# Patient Record
Sex: MALE | Race: WHITE | NOT HISPANIC OR LATINO | Employment: UNEMPLOYED | URBAN - METROPOLITAN AREA
[De-identification: names, ages, dates, MRNs, and addresses within clinical notes are randomized per-mention and may not be internally consistent; named-entity substitution may affect disease eponyms.]

---

## 2018-07-03 ENCOUNTER — OFFICE VISIT (OUTPATIENT)
Dept: URGENT CARE | Facility: CLINIC | Age: 33
End: 2018-07-03
Payer: COMMERCIAL

## 2018-07-03 VITALS
HEIGHT: 69 IN | DIASTOLIC BLOOD PRESSURE: 80 MMHG | HEART RATE: 84 BPM | BODY MASS INDEX: 31.84 KG/M2 | TEMPERATURE: 98 F | OXYGEN SATURATION: 98 % | SYSTOLIC BLOOD PRESSURE: 128 MMHG | WEIGHT: 215 LBS | RESPIRATION RATE: 16 BRPM

## 2018-07-03 DIAGNOSIS — H57.89 IRRITATION OF RIGHT EYE: ICD-10-CM

## 2018-07-03 DIAGNOSIS — H10.9 CONJUNCTIVITIS OF RIGHT EYE, UNSPECIFIED CONJUNCTIVITIS TYPE: ICD-10-CM

## 2018-07-03 PROCEDURE — 99204 OFFICE O/P NEW MOD 45 MIN: CPT | Performed by: NURSE PRACTITIONER

## 2018-07-03 RX ORDER — POLYMYXIN B SULFATE AND TRIMETHOPRIM 1; 10000 MG/ML; [USP'U]/ML
1 SOLUTION OPHTHALMIC EVERY 4 HOURS
Qty: 10 ML | Refills: 0 | Status: SHIPPED | OUTPATIENT
Start: 2018-07-03 | End: 2018-07-08

## 2018-07-03 ASSESSMENT — ENCOUNTER SYMPTOMS
EYE REDNESS: 1
COUGH: 0
FEVER: 0

## 2018-07-04 NOTE — PROGRESS NOTES
"Subjective:      Nadir Carranza is a 33 y.o. male who presents with Eye Problem (Since yesterday eye redness and sore)            Conjunctivitis   This is a new problem. Episode onset: pt reports he thinks he has pink eye. States he believes he was exposed 2 days ago when he was wearing a borrowed helmet. He reports tenderness around right eye with some redness to eye. Denies drainage. The problem occurs constantly. The problem has been unchanged. Pertinent negatives include no congestion, coughing or fever. He has tried nothing for the symptoms.       Review of Systems   Constitutional: Negative for fever.   HENT: Negative for congestion.    Eyes: Positive for redness.   Respiratory: Negative for cough.    All other systems reviewed and are negative.    No past medical history on file. No past surgical history on file.   Social History     Social History   • Marital status: Unknown     Spouse name: N/A   • Number of children: N/A   • Years of education: N/A     Occupational History   • Not on file.     Social History Main Topics   • Smoking status: Never Smoker   • Smokeless tobacco: Never Used   • Alcohol use Not on file   • Drug use: Unknown   • Sexual activity: Not on file     Other Topics Concern   • Not on file     Social History Narrative   • No narrative on file          Objective:     /80   Pulse 84   Temp 36.7 °C (98 °F)   Resp 16   Ht 1.753 m (5' 9\")   Wt 97.5 kg (215 lb)   SpO2 98%   BMI 31.75 kg/m²      Physical Exam   Constitutional: He is oriented to person, place, and time. Vital signs are normal. He appears well-developed and well-nourished.   HENT:   Head: Normocephalic and atraumatic.   Eyes: EOM are normal. Pupils are equal, round, and reactive to light. Right eye exhibits no discharge. Right conjunctiva is injected (slight).   Neck: Normal range of motion.   Cardiovascular: Normal rate and regular rhythm.    Pulmonary/Chest: Effort normal.   Musculoskeletal: Normal range " of motion.   Neurological: He is alert and oriented to person, place, and time.   Skin: Skin is warm and dry. Capillary refill takes less than 2 seconds.   Psychiatric: He has a normal mood and affect. His speech is normal and behavior is normal. Thought content normal.   Vitals reviewed.              Assessment/Plan:     1. Irritation of right eye  - polymixin-trimethoprim (POLYTRIM) 81751-5.1 UNIT/ML-% Solution; Place 1 Drop in right eye every 4 hours for 5 days.  Dispense: 10 mL; Refill: 0    2. Conjunctivitis of right eye, unspecified conjunctivitis type  - polymixin-trimethoprim (POLYTRIM) 58819-9.1 UNIT/ML-% Solution; Place 1 Drop in right eye every 4 hours for 5 days.  Dispense: 10 mL; Refill: 0    Discussed with patient this does not appear to be consistent with bacterial conjunctivitis. It is more consistent with contact irritation  Contingent antibiotic prescription given to patient to fill upon meeting criteria of guidelines discussed.   Warm compresses to affected eye(s) 3 times daily  Hand hygiene discussed  Wash all recently used linens and towels in hot water  Do not touch dropper to eye (s)  Supportive care, differential diagnoses, and indications for immediate follow-up discussed with patient.    Pathogenesis of diagnosis discussed including typical length and natural progression.      Instructed to return to  or nearest emergency department if symptoms fail to improve, for any change in condition, further concerns, or new concerning symptoms.  Patient states understanding of the plan of care and discharge instructions.

## 2019-03-11 ENCOUNTER — OFFICE VISIT (OUTPATIENT)
Dept: URGENT CARE | Facility: CLINIC | Age: 34
End: 2019-03-11
Payer: COMMERCIAL

## 2019-03-11 ENCOUNTER — APPOINTMENT (OUTPATIENT)
Dept: RADIOLOGY | Facility: IMAGING CENTER | Age: 34
End: 2019-03-11
Attending: PHYSICIAN ASSISTANT
Payer: COMMERCIAL

## 2019-03-11 VITALS
SYSTOLIC BLOOD PRESSURE: 122 MMHG | WEIGHT: 215 LBS | HEART RATE: 88 BPM | OXYGEN SATURATION: 97 % | TEMPERATURE: 98 F | BODY MASS INDEX: 31.84 KG/M2 | RESPIRATION RATE: 20 BRPM | HEIGHT: 69 IN | DIASTOLIC BLOOD PRESSURE: 80 MMHG

## 2019-03-11 DIAGNOSIS — R05.9 COUGH: ICD-10-CM

## 2019-03-11 DIAGNOSIS — S76.212A GROIN STRAIN, LEFT, INITIAL ENCOUNTER: ICD-10-CM

## 2019-03-11 DIAGNOSIS — R07.9 CHEST PAIN, UNSPECIFIED TYPE: ICD-10-CM

## 2019-03-11 LAB — EKG 4674: NORMAL

## 2019-03-11 PROCEDURE — 93000 ELECTROCARDIOGRAM COMPLETE: CPT | Performed by: PHYSICIAN ASSISTANT

## 2019-03-11 PROCEDURE — 99215 OFFICE O/P EST HI 40 MIN: CPT | Performed by: PHYSICIAN ASSISTANT

## 2019-03-11 PROCEDURE — 71046 X-RAY EXAM CHEST 2 VIEWS: CPT | Mod: 26 | Performed by: PHYSICIAN ASSISTANT

## 2019-03-11 NOTE — LETTER
March 11, 2019         Patient: Nadir Carranza   YOB: 1985   Date of Visit: 3/11/2019           To Whom it May Concern:    Nadir Carranza was seen in my clinic on 3/11/2019. Patient was evaluated today in clinic, please accommodate this patient with stand up desk.     If you have any questions or concerns, please don't hesitate to call.        Sincerely,           Mehrdad Escalante P.A.-C.  Electronically Signed

## 2019-03-14 ASSESSMENT — ENCOUNTER SYMPTOMS: LEG PAIN: 1

## 2019-03-14 NOTE — PROGRESS NOTES
"Subjective:      Nadir Carranza is a 33 y.o. male who presents with Leg Pain (couple days left thigh pain radiated to the knee)            Pt. Is a 32 y/o male who presents to urgent care with pain to his left groin for the last 2 weeks. Pt. Reports that he might of \"pulled\" it working out however is uncertain. He does report that the pain sometimes radiates to his medial knee as well. He admits that the pain is intermittent and he only had the pain intermittently and denies  increased warmth redness, swelling. He reports today that he is worried about possible DVT and more recently reports slight chest \"discomfort\". He reports that this too is intermittent, and had some earlier worse when he breaths deep, but reports that he also has had a slight cough as well. He denies any SOB, current chest pain. When he gets the pain it is mild and hard to tell sometimes. He denies any exertional chest pain, wheezing, or injury. He denies prior DVT, PE, travels, surgeries, or stasis.       Leg Pain   This is a new problem. Episode onset: 2 weeks ago. The problem occurs every several days. The problem has been waxing and waning. Associated symptoms include chest pain and coughing. Pertinent negatives include no abdominal pain, chills, congestion, fever, headaches, joint swelling, rash, sore throat or vomiting. Exacerbated by: certain movements. He has tried nothing for the symptoms.       Review of Systems   Constitutional: Negative for chills, fever and malaise/fatigue.   HENT: Negative for congestion and sore throat.    Eyes: Negative for discharge and redness.   Respiratory: Positive for cough. Negative for hemoptysis, sputum production and shortness of breath.    Cardiovascular: Positive for chest pain. Negative for palpitations, orthopnea, claudication and leg swelling.   Gastrointestinal: Negative for abdominal pain and vomiting.   Musculoskeletal: Negative for joint swelling.   Skin: Negative for itching and " "rash.   Neurological: Negative for headaches.   All other systems reviewed and are negative.         Objective:     /80 (BP Location: Right arm, Patient Position: Sitting, BP Cuff Size: Large adult)   Pulse 88   Temp 36.7 °C (98 °F) (Temporal)   Resp 20   Ht 1.753 m (5' 9\")   Wt 97.5 kg (215 lb)   SpO2 97%   BMI 31.75 kg/m²    PMH:  has no past medical history on file.  MEDS: No current outpatient prescriptions on file.  ALLERGIES: No Known Allergies  SURGHX: No past surgical history on file.  SOCHX:  reports that he has never smoked. He has never used smokeless tobacco.  FH: Family history was reviewed, no pertinent findings to report    Physical Exam   Constitutional: He is oriented to person, place, and time. He appears well-developed and well-nourished. No distress.   HENT:   Head: Normocephalic and atraumatic.   Right Ear: External ear normal.   Left Ear: External ear normal.   Mouth/Throat: Oropharynx is clear and moist. No oropharyngeal exudate.   Eyes: Pupils are equal, round, and reactive to light. Conjunctivae and EOM are normal.   Neck: Normal range of motion. Neck supple. No tracheal deviation present.   Cardiovascular: Normal rate and regular rhythm.    No murmur heard.  Pulmonary/Chest: Effort normal and breath sounds normal. No respiratory distress.   Musculoskeletal: Normal range of motion. He exhibits no edema.        Legs:  Unable to reproduce the pain with palpation.   Discomfort with external rotation and flexion at the knee.   Without swelling, increased warmth, localized tenderness. Calf nontender, without tenderness to the popliteal fossa.    Neurological: He is alert and oriented to person, place, and time. Coordination normal.   Skin: Skin is warm. No rash noted.   Psychiatric: He has a normal mood and affect. His behavior is normal. Judgment and thought content normal.   Vitals reviewed.              Assessment/Plan:     1. Cough  - EKG  - DX-CHEST-2 VIEWS; Future    2. Chest " pain, unspecified type  - EKG  - DX-CHEST-2 VIEWS; Future    3. Groin strain, left, initial encounter    Well's criteria for DVT score: -2  PERC score- 0    Discussed EKG findings today.   EKG: NSR at a rate of 65, without acute ST changes, Normal axis, no old to compare.     Discussed both the PERC score and Well's criteria today and noted that he scores very low and probability was low. Pt. Was still concerned- offered D. Dimer at this time- he declines and further testing.     CXR:  The soft tissues and bony structures are unremarkable.  The heart and mediastinal structures are within normal limits.  Pulmonary vascularity is normal.  The lung fields are clear.  There is no effusion or pneumothorax.    Discussed all the results today, pt. Is with no risk factors  for DVT, nor PE, also low scores.   Finally most likely is groin strain- more in the distribution of the gracilus muscle.  Spent more than total of 45 min. Face to faceWith this patient discussing, hx, testing, work up, and results.   Pt. Is to alert me via [facility] for any changes, and worrisome s/s. He is to follow up with his PCP within a week to ensure that symptoms are improving. Ice, NSAIDs, and light stretching were all discussed today.     Finally patient requesting note for a stand up desk as he feels that he sits at his job most of the day.     Patient given precautionary s/sx that mandate immediate follow up and evaluation in the ED. Advised of risks of not doing so.    DDX, Supportive care, and indications for immediate follow-up discussed with patient.    Instructed to return to clinic or nearest emergency department if we are not available for any change in condition, further concerns, or worsening of symptoms.    The patient demonstrated a good understanding and agreed with the treatment plan.  Please note that this dictation was created using voice recognition software. I have made every reasonable attempt to correct obvious errors, but I  expect that there are errors of grammar and possibly content that I did not discover before finalizing the note.

## 2019-03-15 ASSESSMENT — ENCOUNTER SYMPTOMS
HEADACHES: 0
JOINT SWELLING: 0
EYE DISCHARGE: 0
ORTHOPNEA: 0
HEMOPTYSIS: 0
SPUTUM PRODUCTION: 0
FEVER: 0
EYE REDNESS: 0
SHORTNESS OF BREATH: 0
PALPITATIONS: 0
SORE THROAT: 0
COUGH: 1
ABDOMINAL PAIN: 0
CLAUDICATION: 0
CHILLS: 0
VOMITING: 0

## 2023-10-02 ENCOUNTER — HOSPITAL ENCOUNTER (OUTPATIENT)
Dept: RADIOLOGY | Facility: MEDICAL CENTER | Age: 38
End: 2023-10-02
Payer: COMMERCIAL

## 2023-10-02 DIAGNOSIS — J06.9 UPPER RESPIRATORY TRACT INFECTIOUS DISEASE: ICD-10-CM

## 2023-10-02 PROCEDURE — 71046 X-RAY EXAM CHEST 2 VIEWS: CPT

## 2024-07-01 ENCOUNTER — NON-PROVIDER VISIT (OUTPATIENT)
Dept: CARDIOLOGY | Facility: MEDICAL CENTER | Age: 39
End: 2024-07-01
Attending: STUDENT IN AN ORGANIZED HEALTH CARE EDUCATION/TRAINING PROGRAM
Payer: COMMERCIAL

## 2024-07-01 DIAGNOSIS — R00.2 PALPITATIONS: ICD-10-CM

## 2024-07-01 PROCEDURE — 93242 EXT ECG>48HR<7D RECORDING: CPT

## 2024-07-10 ENCOUNTER — TELEPHONE (OUTPATIENT)
Dept: CARDIOLOGY | Facility: MEDICAL CENTER | Age: 39
End: 2024-07-10
Payer: COMMERCIAL